# Patient Record
Sex: MALE | Race: WHITE | Employment: FULL TIME | URBAN - METROPOLITAN AREA
[De-identification: names, ages, dates, MRNs, and addresses within clinical notes are randomized per-mention and may not be internally consistent; named-entity substitution may affect disease eponyms.]

---

## 2017-05-22 RX ORDER — GABAPENTIN 300 MG/1
CAPSULE ORAL
Qty: 60 CAPSULE | Refills: 2 | Status: SHIPPED | OUTPATIENT
Start: 2017-05-22 | End: 2017-09-13

## 2017-09-13 ENCOUNTER — OFFICE VISIT (OUTPATIENT)
Dept: INTERNAL MEDICINE CLINIC | Age: 37
End: 2017-09-13

## 2017-09-13 VITALS
SYSTOLIC BLOOD PRESSURE: 120 MMHG | RESPIRATION RATE: 16 BRPM | WEIGHT: 215 LBS | HEART RATE: 100 BPM | BODY MASS INDEX: 28.37 KG/M2 | DIASTOLIC BLOOD PRESSURE: 60 MMHG | TEMPERATURE: 99.8 F

## 2017-09-13 DIAGNOSIS — E78.2 MIXED HYPERLIPIDEMIA: ICD-10-CM

## 2017-09-13 DIAGNOSIS — Z11.4 SCREENING FOR HIV (HUMAN IMMUNODEFICIENCY VIRUS): ICD-10-CM

## 2017-09-13 DIAGNOSIS — H61.21 IMPACTED CERUMEN OF RIGHT EAR: ICD-10-CM

## 2017-09-13 DIAGNOSIS — R53.83 FATIGUE, UNSPECIFIED TYPE: ICD-10-CM

## 2017-09-13 DIAGNOSIS — J01.00 ACUTE NON-RECURRENT MAXILLARY SINUSITIS: Primary | ICD-10-CM

## 2017-09-13 PROCEDURE — 99214 OFFICE O/P EST MOD 30 MIN: CPT | Performed by: INTERNAL MEDICINE

## 2017-09-13 RX ORDER — AMOXICILLIN AND CLAVULANATE POTASSIUM 875; 125 MG/1; MG/1
1 TABLET, FILM COATED ORAL 2 TIMES DAILY
Qty: 20 TABLET | Refills: 0 | Status: SHIPPED | OUTPATIENT
Start: 2017-09-13 | End: 2017-09-23

## 2017-09-14 DIAGNOSIS — H61.21 IMPACTED CERUMEN OF RIGHT EAR: Primary | ICD-10-CM

## 2018-12-14 ENCOUNTER — TELEPHONE (OUTPATIENT)
Dept: INTERNAL MEDICINE CLINIC | Age: 38
End: 2018-12-14

## 2018-12-14 RX ORDER — AZITHROMYCIN 250 MG/1
TABLET, FILM COATED ORAL
Qty: 1 PACKET | Refills: 0 | Status: SHIPPED | OUTPATIENT
Start: 2018-12-14 | End: 2019-02-12 | Stop reason: ALTCHOICE

## 2019-02-12 ENCOUNTER — OFFICE VISIT (OUTPATIENT)
Dept: INTERNAL MEDICINE CLINIC | Age: 39
End: 2019-02-12
Payer: COMMERCIAL

## 2019-02-12 VITALS
BODY MASS INDEX: 30.34 KG/M2 | HEART RATE: 91 BPM | RESPIRATION RATE: 16 BRPM | DIASTOLIC BLOOD PRESSURE: 70 MMHG | SYSTOLIC BLOOD PRESSURE: 126 MMHG | WEIGHT: 230 LBS | OXYGEN SATURATION: 99 %

## 2019-02-12 DIAGNOSIS — K86.89 PANCREATIC INSUFFICIENCY: Primary | ICD-10-CM

## 2019-02-12 PROCEDURE — 99214 OFFICE O/P EST MOD 30 MIN: CPT | Performed by: INTERNAL MEDICINE

## 2019-03-18 PROBLEM — Z12.11 COLON CANCER SCREENING: Status: ACTIVE | Noted: 2019-03-18

## 2019-03-23 ENCOUNTER — HOSPITAL ENCOUNTER (EMERGENCY)
Age: 39
Discharge: HOME | End: 2019-03-23
Attending: EMERGENCY MEDICINE
Payer: COMMERCIAL

## 2019-03-23 VITALS
DIASTOLIC BLOOD PRESSURE: 80 MMHG | BODY MASS INDEX: 29.82 KG/M2 | WEIGHT: 225 LBS | SYSTOLIC BLOOD PRESSURE: 127 MMHG | OXYGEN SATURATION: 98 % | TEMPERATURE: 98.1 F | HEIGHT: 73 IN | HEART RATE: 62 BPM | RESPIRATION RATE: 12 BRPM

## 2019-03-23 DIAGNOSIS — T14.8XXA INTRAMUSCULAR HEMATOMA: Primary | ICD-10-CM

## 2019-03-23 PROCEDURE — 99282 EMERGENCY DEPT VISIT SF MDM: CPT

## 2019-03-23 ASSESSMENT — PAIN SCALES - GENERAL: PAINLEVEL_OUTOF10: 6

## 2019-03-23 ASSESSMENT — PAIN DESCRIPTION - ORIENTATION: ORIENTATION: OTHER (COMMENT)

## 2019-03-23 ASSESSMENT — PAIN DESCRIPTION - PAIN TYPE: TYPE: ACUTE PAIN

## 2019-04-17 PROBLEM — Z12.11 COLON CANCER SCREENING: Status: RESOLVED | Noted: 2019-03-18 | Resolved: 2019-04-17

## 2019-12-04 ENCOUNTER — OFFICE VISIT (OUTPATIENT)
Dept: INTERNAL MEDICINE CLINIC | Age: 39
End: 2019-12-04
Payer: COMMERCIAL

## 2019-12-04 VITALS
SYSTOLIC BLOOD PRESSURE: 126 MMHG | DIASTOLIC BLOOD PRESSURE: 74 MMHG | WEIGHT: 228.4 LBS | RESPIRATION RATE: 16 BRPM | HEART RATE: 97 BPM | BODY MASS INDEX: 30.13 KG/M2 | OXYGEN SATURATION: 98 %

## 2019-12-04 DIAGNOSIS — M75.82 PECTORALIS MAJOR TENDINITIS, LEFT: Primary | ICD-10-CM

## 2019-12-04 PROCEDURE — 99213 OFFICE O/P EST LOW 20 MIN: CPT | Performed by: INTERNAL MEDICINE

## 2019-12-04 ASSESSMENT — PATIENT HEALTH QUESTIONNAIRE - PHQ9
2. FEELING DOWN, DEPRESSED OR HOPELESS: 0
SUM OF ALL RESPONSES TO PHQ9 QUESTIONS 1 & 2: 0
SUM OF ALL RESPONSES TO PHQ QUESTIONS 1-9: 0
SUM OF ALL RESPONSES TO PHQ QUESTIONS 1-9: 0
1. LITTLE INTEREST OR PLEASURE IN DOING THINGS: 0

## 2020-01-03 PROBLEM — Z12.11 COLON CANCER SCREENING: Status: RESOLVED | Noted: 2019-03-18 | Resolved: 2020-01-03

## 2021-03-13 ENCOUNTER — APPOINTMENT (OUTPATIENT)
Dept: ULTRASOUND IMAGING | Age: 41
End: 2021-03-13
Payer: COMMERCIAL

## 2021-03-13 ENCOUNTER — APPOINTMENT (OUTPATIENT)
Dept: GENERAL RADIOLOGY | Age: 41
End: 2021-03-13
Payer: COMMERCIAL

## 2021-03-13 ENCOUNTER — HOSPITAL ENCOUNTER (EMERGENCY)
Age: 41
Discharge: HOME | End: 2021-03-13
Payer: COMMERCIAL

## 2021-03-13 VITALS
OXYGEN SATURATION: 100 % | HEIGHT: 73 IN | BODY MASS INDEX: 29.16 KG/M2 | SYSTOLIC BLOOD PRESSURE: 133 MMHG | WEIGHT: 220 LBS | HEART RATE: 61 BPM | DIASTOLIC BLOOD PRESSURE: 87 MMHG | RESPIRATION RATE: 16 BRPM | TEMPERATURE: 98 F

## 2021-03-13 DIAGNOSIS — M79.605 LEFT LEG PAIN: Primary | ICD-10-CM

## 2021-03-13 DIAGNOSIS — M25.562 POSTERIOR KNEE PAIN, LEFT: ICD-10-CM

## 2021-03-13 PROCEDURE — 73564 X-RAY EXAM KNEE 4 OR MORE: CPT

## 2021-03-13 PROCEDURE — 99285 EMERGENCY DEPT VISIT HI MDM: CPT

## 2021-03-13 PROCEDURE — 93971 EXTREMITY STUDY: CPT

## 2021-03-13 RX ORDER — NAPROXEN 500 MG/1
500 TABLET ORAL 2 TIMES DAILY PRN
Qty: 20 TABLET | Refills: 0 | Status: SHIPPED | OUTPATIENT
Start: 2021-03-13 | End: 2022-07-18

## 2021-03-13 NOTE — ED PROVIDER NOTES
eMERGENCY dEPARTMENT eNCOUnter    [address]      PCP: Brii Escoto MD    [address] [facility]    Chief Complaint   Patient presents with    Leg Pain     left hamstring and knee pain Monday and prior to this by slipping on ice       HPI    Donovan Schwab is a 36 y.o. male who presents with posterior left knee and hamstring pain. Onset has been progressively worsening over the last several weeks. Context is patient states in his [place] a month ago when he did frequent \"near slipping\" on wet nilsa while at work but was able to catch himself and denies any blunt trauma or fall onto the left lower leg. Believes that he initially pulled his left hamstring. Began having anterior left knee pain on Monday with increasing pain to the posterior knee last evening while at the grocery store. Denies any fall or blunt trauma to the left knee. No previous history of left lower leg fracture or surgery. Pain worsens with weightbearing. Pain does intermittently radiate into the left calf. At time of my evaluation, patient is denying any pain. No numbness tingling weakness rating down the left lower leg. No saddle paresthesia. No bowel incontinence or bladder retention. No back pain or history of IV drug use. Has not noted any skin changes, rash or bruising to the posterior left leg. No history of IV drug use. Patient is most concerned for possible DVT but has no previous history of this. No recent long travel hospitalizations, surgeries, prolonged immobilization, personal history of malignancy. REVIEW OF SYSTEMS    General: Denies fever or chills  Cardiac: Denies chest pain  Pulmonary: Denies shortness of breath  GI: Denies abdominal pain, vomiting, or diarrhea  : No dysuria or hematuria    Denies any other muscles skeletal injuries, including chest wall and back.     All other review of systems are negative  See HPI and nursing notes for additional information     PAST MEDICAL & SURGICAL HISTORY    Past Medical History:   Diagnosis Date    Depression and anxiety 2008    Rosita    Medial epicondylitis of elbow 5/6/2015    Restless leg syndrome 2015    Snuff user 2014     History reviewed. No pertinent surgical history. CURRENT MEDICATIONS    Current Outpatient Rx   Medication Sig Dispense Refill    naproxen (NAPROSYN) 500 MG tablet Take 1 tablet by mouth 2 times daily as needed for Pain 20 tablet 0    clonazePAM (KLONOPIN) 0.5 MG tablet Take 0.5 mg by mouth 2 times daily as needed. Marga Greaser escitalopram (LEXAPRO) 10 MG tablet Take 10 mg by mouth daily         ALLERGIES    No Known Allergies    SOCIAL & FAMILY HISTORY    Social History     Socioeconomic History    Marital status:      Spouse name: Dennise Flores Number of children: None    Years of education: HS    Highest education level: None   Occupational History    Occupation:    Social Needs    Financial resource strain: None    Food insecurity     Worry: None     Inability: None    Transportation needs     Medical: None     Non-medical: None   Tobacco Use    Smoking status: Never Smoker    Smokeless tobacco: Current User     Types: Snuff    Tobacco comment: snuff tobacco   Substance and Sexual Activity    Alcohol use: No     Alcohol/week: 0.0 standard drinks    Drug use: No    Sexual activity: None   Lifestyle    Physical activity     Days per week: None     Minutes per session: None    Stress: None   Relationships    Social connections     Talks on phone: None     Gets together: None     Attends Caodaism service: None     Active member of club or organization: None     Attends meetings of clubs or organizations: None     Relationship status: None    Intimate partner violence     Fear of current or ex partner: None     Emotionally abused: None     Physically abused: None     Forced sexual activity: None   Other Topics Concern    None   Social History Narrative    None     History reviewed. No pertinent family history. PHYSICAL EXAM    VITAL SIGNS: /87   Pulse 61   Temp 98 °F (36.7 °C) (Oral)   Resp 16   Ht 6' 1\" (1.854 m)   Wt 220 lb (99.8 kg)   SpO2 100%   BMI 29.03 kg/m²   Constitutional:  Well developed, well nourished, no acute distress, non-toxic appearance   HENT:  Atraumatic, Normocephalic, EOMIs, conjunctiva clear, nasal bones midline  Musculoskeletal:    Left lower leg exam:   -Inspection:  No obvious defects or deformities, skin intact. No asymmetry of the left lower leg compared to the right. No abnormal hip rotation or limb shortening.   - Palpation: No redness, or warmth. Mild tenderness palpation along the mid to distal posterior thigh/hamstring and popliteal left knee without palpable bony or soft tissue defects. No knee joint effusion. No anterior knee joint tenderness, no localized tenderness of the tibial plateau. Hip joint is stable and nontender. Compartments are soft throughout the left lower extremity. Calf is supple without woody sensation. -ROM: Full range of motion of left hip, knee and ankle with 5/5 strength on resistance testing. Achilles tendon is clinically intact on provocative testing. No knee joint laxity. No swelling, discoloration, tenderness to palpation, or range of motion deficit of the ipsilateral hip and ankle  Vascular: Distal pulses (DP, PT) intact on affected side. Integument:  Well hydrated, no rash   Neurologic:  Awake and alert, normal flow of speech. No foot drop of the affected extremity. DTRs and distal sensation equal/intact. Psychiatric: Cooperative, pleasant affect         RADIOLOGY/PROCEDURES          VL DUP LOWER EXTREMITY VENOUS LEFT (Final result)  Result time 03/13/21 15:56:43  Final result by Freddy Colbert MD (03/13/21 15:56:43)                Impression:    No evidence of DVT in the left lower extremity.              Narrative:    EXAMINATION:   DUPLEX VENOUS ULTRASOUND OF THE LEFT LOWER EXTREMITY     3/13/2021 3:22 pm     TECHNIQUE:   Duplex ultrasound using B-mode/gray scaled imaging and Doppler spectral   analysis and color flow was obtained of the left lower extremity. COMPARISON:   None. HISTORY:   ORDERING SYSTEM PROVIDED HISTORY: pain posterior thigh/knee, nki, eval for DVT     Acuity: Unknown   Type of Exam: Initial     FINDINGS:   The visualized veins of the left lower extremity are patent and free of   echogenic thrombus. The veins demonstrate good compressibility with normal   color flow study and spectral analysis.         XR KNEE LEFT (MIN 4 VIEWS) (Final result)  Result time 03/13/21 15:32:19  Procedure changed from XR KNEE LEFT (3 VIEWS)  Final result by Rowdy Cooper MD (03/13/21 15:32:19)                Impression:    No acute osseous abnormality of the left knee. Narrative:    EXAMINATION:   FOUR XRAY VIEWS OF THE LEFT KNEE     3/13/2021 3:15 pm     COMPARISON:   None. HISTORY:   Acute atraumatic pain for 3 weeks. FINDINGS:   No acute fracture or dislocation.  No significant degenerative changes or   definite joint effusion.  Nonspecific soft tissue irregularity posterior to   the distal femur.                       ED COURSE & MEDICAL DECISION MAKING       Vital signs and nursing notes reviewed during ED course. I have independently evaluated this patient . Supervising MD  - Dr. Delisa Schmitt - present in the Emergency Department, available for consultation, throughout entirety of  patient care. All pertinent Lab data and radiographic results reviewed with patient at bedside. The patient and/or the family were informed of the results of any tests/labs/imaging, the treatment plan, and time was allotted to answer questions.        Differential diagnosis: includes but not limited to ligamentous injury, tendon injury, soft tissue contusion/hematoma, fracture, dislocation, Infection, Neurologic Deficit/Injury, Meniscus tear, ACL tear, tibial plateau fracture, extensor mechanism rupture, dislocation, Arterial Injury/Ischemia. Clinical  IMPRESSION    1. Left leg pain    2. Posterior knee pain, left          Patient presents with posterior left thigh/knee pain for 4 weeks without blunt trauma. On exam, well-appearing nontoxic 28-year-old male, in no acute distress. Noted mildly hypertensive 157/104 but remaining vitals within normal limits. Denying any chest pain or shortness of breath. No gross bony deformities or asymmetry of the left lower leg compared to the right. Calf is supple and nontender. Mild tenderness location along the mid to distal posterior left thigh and popliteal knee without palpable bony or soft tissue defects. Knee joint is stable, no knee joint effusion or tenderness over the tibial plateau. Achilles tendon is clinically intact. Did offer pain control in the ED which patient declined. X-ray of the left knee reveals no evidence of osseous abnormality or joint effusion. Venous Doppler of the left lower leg is also negative for evidence of DVT. This time, unclear exact etiology of patient's persistent pain. Suspect musculoskeletal in nature, possible hamstring strain versus sprain given his near slip and slide injuries. We discussed symptomatic management outpatient follow-up with family doctor as well as orthopedics. Low clinical suspicion for ischemic limb, compartment syndrome, intra-articular joint infection/septic arthritis, DVT, osteomyelitis, arterial/neurologic injury, necrotizing fasciitis, or infected/rapidly expanding hematoma. Patient is discharged in stable condition. Educated on [address] therapy. Patient is given anti-inflammatories. May continue weightbearing as tolerated. Encouraged heat applications. . Patient is instructed to followup with orthopedics in 7-10 days, sooner with worsened symptoms.  Return precautions back to the ED discussed for any new or worsening symptoms. In light of current events, I did utilize appropriate PPE (including N95 face mask, safety glasses, gloves as recommended by the health facility/national standard best practice, during my bedside interactions with the patient. Patient was masked throughout ED course. Full droplet precaution as well as full PPE was followed throughout patient's ED course and evaluation. Diagnosis and plan discussed in detail with patient who understands and agrees. Patient agrees to return emergency department if symptoms worsen or any new symptoms develop. Comment: Please note this report has been produced using speech recognition software and may contain errors related to that system including errors in grammar, punctuation, and spelling, as well as words and phrases that may be inappropriate. If there are any questions or concerns please feel free to contact the dictating provider for clarification.           Vidal Emerson PA-C  03/13/21 1938

## 2021-03-16 ENCOUNTER — HOSPITAL ENCOUNTER (EMERGENCY)
Age: 41
Discharge: HOME | End: 2021-03-16
Attending: EMERGENCY MEDICINE
Payer: COMMERCIAL

## 2021-03-16 VITALS
OXYGEN SATURATION: 96 % | HEART RATE: 80 BPM | DIASTOLIC BLOOD PRESSURE: 86 MMHG | TEMPERATURE: 98.5 F | SYSTOLIC BLOOD PRESSURE: 138 MMHG | RESPIRATION RATE: 12 BRPM

## 2021-03-16 DIAGNOSIS — M79.605 LEFT LEG PAIN: Primary | ICD-10-CM

## 2021-03-16 PROCEDURE — 99283 EMERGENCY DEPT VISIT LOW MDM: CPT | Performed by: EMERGENCY MEDICINE

## 2021-03-16 ASSESSMENT — PAIN SCALES - GENERAL: PAINLEVEL_OUTOF10: 5

## 2021-03-16 ASSESSMENT — PAIN DESCRIPTION - LOCATION: LOCATION: LEG

## 2021-03-16 ASSESSMENT — PAIN DESCRIPTION - ORIENTATION: ORIENTATION: LEFT

## 2021-03-17 NOTE — ED NOTES
Patient presents to Ed with complaints of left leg pain, reports was seen here Saturday had ultrasound and now having worse pain.       Angie Calle RN  03/16/21 2889

## 2021-03-17 NOTE — ED PROVIDER NOTES
Emergency Department Encounter    Patient: Anisha Moon  MRN: [id number]  : 1980  Date of Evaluation: 3/16/2021  ED Provider:  Hector Jones    Triage Chief Complaint:   Leg Pain (left, seen saturday now pain is worse)      [place]:  Anisha Moon is a 36 y.o. male that presents to the emergency department for evaluation of left lower extremity pain. Patient was seen and evaluated in our emergency department on 3/13/2021 for similar symptoms. Over the past several weeks, he has had progressively worsening left lower extremity pain. Patient went to [place] 1 month ago and he had a near slipping incident on the floor. He was able to catch himself. No falls. He felt that he pulled his hamstring. Since then, he has had on and off pain. On evaluation, he was told that he does not have a blood clot and no fractures. He was discharged and instructed to rest, ice, compress, elevate the extremity. Patient was doing some reading online, became concerned about blood clot, decided to come to the emergency department for evaluation. He denies any new or changes in symptoms. Denies numbness, tingling, paresthesias. Denies color change of the extremity. No trauma. Denies additional precipitating, Ativan, alleviating features. ROS - see HPI, below listed is current ROS at time of my eval:  General:  No fevers  Musculoskeletal: Left leg pain  Skin:  No rash, no pruritis, no easy bruising  Neurologic:   no extremity numbness, no extremity tingling, no extremity weakness  Extremities: No cyanosis    Past Medical History:   Diagnosis Date    Depression and anxiety     Rosita    Medial epicondylitis of elbow 2015    Restless leg syndrome 2015    Snuff user        History reviewed. No pertinent surgical history. History reviewed. No pertinent family history.     Social History     Socioeconomic History    Marital status:      Spouse name: Varinder Hernandez Number of children: Not on file    Years of education: HS    Highest education level: Not on file   Occupational History    Occupation:    Social Needs    Financial resource strain: Not on file    Food insecurity     Worry: Not on file     Inability: Not on file   [employer] needs     Medical: Not on file     Non-medical: Not on file   Tobacco Use    Smoking status: Never Smoker    Smokeless tobacco: Current User     Types: Snuff    Tobacco comment: snuff tobacco   Substance and Sexual Activity    Alcohol use: No     Alcohol/week: 0.0 standard drinks    Drug use: No    Sexual activity: Not on file   Lifestyle    Physical activity     Days per week: Not on file     Minutes per session: Not on file    Stress: Not on file   Relationships    Social connections     Talks on phone: Not on file     Gets together: Not on file     Attends Christian service: Not on file     Active member of club or organization: Not on file     Attends meetings of clubs or organizations: Not on file     Relationship status: Not on file    Intimate partner violence     Fear of current or ex partner: Not on file     Emotionally abused: Not on file     Physically abused: Not on file     Forced sexual activity: Not on file   Other Topics Concern    Not on file   Social History Narrative    Not on file       No current facility-administered medications for this encounter. Current Outpatient Medications   Medication Sig Dispense Refill    naproxen (NAPROSYN) 500 MG tablet Take 1 tablet by mouth 2 times daily as needed for Pain 20 tablet 0    clonazePAM (KLONOPIN) 0.5 MG tablet Take 0.5 mg by mouth 2 times daily as needed. Hepzibah Flattery escitalopram (LEXAPRO) 10 MG tablet Take 10 mg by mouth daily         No Known Allergies    Nursing Notes Reviewed    Physical Exam:  Triage VS:    ED Triage Vitals [03/16/21 2231]   Enc Vitals Group      /86      Pulse 80      Resp 12      Temp 98.5 °F (36.9 °C)      Temp Source Oral      SpO2 96 %     A complete physical exam was unable to be performed as patient is evaluated through telehealth. MDM:  Patient was seen and evaluated in the emergency department by myself via telehealth platform. Records from recent visit on 3/13/2021 reviewed. Left knee x-ray radiology report reads no acute osseous abnormality left knee. Left lower extremity venous duplex radiology report reads the visualized veins of the left lower extremity are patent and free of echogenic thrombus. The veins demonstrate good compressibility with normal color flow and spectral analysis. These findings are discussed with the patient. Patient had a negative DVT study 3 days ago. He is low clinical risk for DVT formation, especially in the past 3 days. I do offer repeat DVT study, patient declines. He feels assured after my discussion. He is instructed to rest, ice, compress, elevate the extremity. Instructed follow-up with primary care provider. Instructed return with any new, worsening, concerning symptoms. He declines in-person evaluation, is satisfied with mental health visit and requests discharge due to long wait in the waiting room. Additional verbal and printed discharge instructions were provided. Patient is discharged in stable, ambulatory condition. Clinical Impression:  1. Left leg pain        Disposition referral:  Candance Amabile, MD  [place] [id number] [phone]      Please follow-up with your primary care provider for reevaluation. [facility] Emergency Department  [facility] [phone]  Go to   Immediately with any new, worsening, concerning symptoms.       ED Provider Disposition:  DISPOSITION Decision To Discharge 03/16/2021 10:42:54 PM      Comment: Please note this report has been produced using speech recognition software and may contain errors related to that system including errors in grammar, punctuation, and spelling, as well as words and phrases that may be inappropriate. If there are any questions or concerns please feel free to contact the dictating provider for clarification.          [address] [facility],   03/16/21 7008

## 2021-05-01 ENCOUNTER — HOSPITAL ENCOUNTER (EMERGENCY)
Age: 41
Discharge: HOME | End: 2021-05-01
Attending: EMERGENCY MEDICINE
Payer: COMMERCIAL

## 2021-05-01 VITALS
OXYGEN SATURATION: 96 % | HEART RATE: 75 BPM | DIASTOLIC BLOOD PRESSURE: 77 MMHG | SYSTOLIC BLOOD PRESSURE: 127 MMHG | WEIGHT: 235 LBS | TEMPERATURE: 99.3 F | RESPIRATION RATE: 17 BRPM | HEIGHT: 73 IN | BODY MASS INDEX: 31.14 KG/M2

## 2021-05-01 DIAGNOSIS — M25.562 CHRONIC PAIN OF LEFT KNEE: ICD-10-CM

## 2021-05-01 DIAGNOSIS — S86.912A STRAIN OF LEFT KNEE, INITIAL ENCOUNTER: Primary | ICD-10-CM

## 2021-05-01 DIAGNOSIS — G89.29 CHRONIC PAIN OF LEFT KNEE: ICD-10-CM

## 2021-05-01 PROCEDURE — 99282 EMERGENCY DEPT VISIT SF MDM: CPT

## 2021-05-01 NOTE — ED PROVIDER NOTES
CHIEF COMPLAINT  Chief Complaint   Patient presents with    Knee Pain     Reports injury 5 months ago but xray was negative, patient requesting MRI       HPI  J Carlos Perez is a 39 y.o. male with history of depression who presents left knee pain for the past 5 months has been waxing and waning, intermittently is at the lateral joint line or the medial joint line. Had a trauma 5 months ago and had a x-ray and DVT ultrasound at that time which was reassuring. He states the injury occurred at work. Since then he has had persistent signs and symptoms and believes he needs further work-up. He denies any overlying redness or fevers. Denies any repeat injury. Denies any pain at the hip or the ankle. He denies other concerns. REVIEW OF SYSTEMS  Review of Systems   History obtained from chart review and the patient  General ROS: negative for - chills or fever    ENT ROS: negative for - headaches  Hematological and Lymphatic ROS: negative for - bleeding problems or blood clots  Endocrine ROS: negative for - skin changes  Respiratory ROS: no cough, shortness of breath, or wheezing  Cardiovascular ROS: no chest pain or dyspnea on exertion    Musculoskeletal ROS: Positive for left knee pain  Neurological ROS: negative for - numbness/tingling or weakness      PAST MEDICAL HISTORY  Past Medical History:   Diagnosis Date    Depression and anxiety 2008    Rosita    Medial epicondylitis of elbow 5/6/2015    Restless leg syndrome 2015    Snuff user 2014       FAMILY HISTORY  History reviewed. No pertinent family history.     SOCIAL HISTORY  Social History     Socioeconomic History    Marital status:      Spouse name: Real Beckham Number of children: None    Years of education: HS    Highest education level: None   Occupational History    Occupation:    Social Needs    Financial resource strain: None    Food insecurity     Worry: None     Inability: None    Transportation needs Medical: None     Non-medical: None   Tobacco Use    Smoking status: Never Smoker    Smokeless tobacco: Current User     Types: Snuff    Tobacco comment: snuff tobacco   Substance and Sexual Activity    Alcohol use: No     Alcohol/week: 0.0 standard drinks    Drug use: No    Sexual activity: None   Lifestyle    Physical activity     Days per week: None     Minutes per session: None    Stress: None   Relationships    Social connections     Talks on phone: None     Gets together: None     Attends Denominational service: None     Active member of club or organization: None     Attends meetings of clubs or organizations: None     Relationship status: None    Intimate partner violence     Fear of current or ex partner: None     Emotionally abused: None     Physically abused: None     Forced sexual activity: None   Other Topics Concern    None   Social History Narrative    None       SURGICAL HISTORY  History reviewed. No pertinent surgical history. CURRENT MEDICATIONS  No current facility-administered medications on file prior to encounter. Current Outpatient Medications on File Prior to Encounter   Medication Sig Dispense Refill    naproxen (NAPROSYN) 500 MG tablet Take 1 tablet by mouth 2 times daily as needed for Pain 20 tablet 0    clonazePAM (KLONOPIN) 0.5 MG tablet Take 0.5 mg by mouth 2 times daily as needed. Marcos Andrews escitalopram (LEXAPRO) 10 MG tablet Take 10 mg by mouth daily           ALLERGIES  No Known Allergies    PHYSICAL EXAM  VITAL SIGNS: /77   Pulse 75   Temp 99.3 °F (37.4 °C) (Oral)   Resp 17   Ht 6' 1\" (1.854 m)   Wt 235 lb (106.6 kg)   SpO2 96%   BMI 31.00 kg/m²   Constitutional: Well developed, Well nourished, No acute distress, Non-toxic appearance. HENT: Normocephalic, Atraumatic, Bilateral external ears normal, skin place per recommendations  Eyes: Conjunctiva normal, No discharge. Neck: Normal range of motion, Supple, No stridor.    Cardiovascular: Normal heart gave me permission to discuss medical history, care, and plan with those present in the room.   Electronically signed by: Felipe Neri MD, 5/1/2021  MD Felipe Victoria MD  05/01/21 1155

## 2022-05-28 ENCOUNTER — HOSPITAL ENCOUNTER (EMERGENCY)
Age: 42
Discharge: HOME | End: 2022-05-29
Payer: COMMERCIAL

## 2022-05-28 DIAGNOSIS — K40.90 UNILATERAL INGUINAL HERNIA WITHOUT OBSTRUCTION OR GANGRENE, RECURRENCE NOT SPECIFIED: Primary | ICD-10-CM

## 2022-05-28 PROCEDURE — 99285 EMERGENCY DEPT VISIT HI MDM: CPT

## 2022-05-28 ASSESSMENT — PAIN SCALES - GENERAL: PAINLEVEL_OUTOF10: 1

## 2022-05-28 ASSESSMENT — PAIN - FUNCTIONAL ASSESSMENT: PAIN_FUNCTIONAL_ASSESSMENT: NONE - DENIES PAIN

## 2022-05-28 NOTE — Clinical Note
Galilea Hair was seen and treated in our emergency department on 5/28/2022. He may return to work on 06/01/2022. Or when cleared by Occupational Health     If you have any questions or concerns, please don't hesitate to call.       Ryan Call PA-C

## 2022-05-29 ENCOUNTER — APPOINTMENT (OUTPATIENT)
Dept: CT IMAGING | Age: 42
End: 2022-05-29
Payer: COMMERCIAL

## 2022-05-29 VITALS
HEIGHT: 73 IN | TEMPERATURE: 98.6 F | WEIGHT: 235 LBS | OXYGEN SATURATION: 99 % | DIASTOLIC BLOOD PRESSURE: 78 MMHG | BODY MASS INDEX: 31.14 KG/M2 | RESPIRATION RATE: 16 BRPM | SYSTOLIC BLOOD PRESSURE: 142 MMHG | HEART RATE: 66 BPM

## 2022-05-29 LAB
ALBUMIN SERPL-MCNC: 4.3 GM/DL (ref 3.4–5)
ALP BLD-CCNC: 59 IU/L (ref 40–129)
ALT SERPL-CCNC: 36 U/L (ref 10–40)
ANION GAP SERPL CALCULATED.3IONS-SCNC: 10 MMOL/L (ref 4–16)
AST SERPL-CCNC: 33 IU/L (ref 15–37)
BASOPHILS ABSOLUTE: 0 K/CU MM
BASOPHILS RELATIVE PERCENT: 0.4 % (ref 0–1)
BILIRUB SERPL-MCNC: 0.3 MG/DL (ref 0–1)
BUN BLDV-MCNC: 26 MG/DL (ref 6–23)
CALCIUM SERPL-MCNC: 9.1 MG/DL (ref 8.3–10.6)
CHLORIDE BLD-SCNC: 99 MMOL/L (ref 99–110)
CO2: 26 MMOL/L (ref 21–32)
CREAT SERPL-MCNC: 1.3 MG/DL (ref 0.9–1.3)
DIFFERENTIAL TYPE: ABNORMAL
EOSINOPHILS ABSOLUTE: 0.2 K/CU MM
EOSINOPHILS RELATIVE PERCENT: 1.6 % (ref 0–3)
GFR AFRICAN AMERICAN: >60 ML/MIN/1.73M2
GFR NON-AFRICAN AMERICAN: >60 ML/MIN/1.73M2
GLUCOSE BLD-MCNC: 110 MG/DL (ref 70–99)
HCT VFR BLD CALC: 51.6 % (ref 42–52)
HEMOGLOBIN: 17 GM/DL (ref 13.5–18)
IMMATURE NEUTROPHIL %: 0.3 % (ref 0–0.43)
LACTATE: 1.1 MMOL/L (ref 0.4–2)
LYMPHOCYTES ABSOLUTE: 1.7 K/CU MM
LYMPHOCYTES RELATIVE PERCENT: 16.8 % (ref 24–44)
MCH RBC QN AUTO: 30.7 PG (ref 27–31)
MCHC RBC AUTO-ENTMCNC: 32.9 % (ref 32–36)
MCV RBC AUTO: 93.3 FL (ref 78–100)
MONOCYTES ABSOLUTE: 0.4 K/CU MM
MONOCYTES RELATIVE PERCENT: 3.9 % (ref 0–4)
NUCLEATED RBC %: 0 %
PDW BLD-RTO: 14.4 % (ref 11.7–14.9)
PLATELET # BLD: 249 K/CU MM (ref 140–440)
PMV BLD AUTO: 9.6 FL (ref 7.5–11.1)
POTASSIUM SERPL-SCNC: 4 MMOL/L (ref 3.5–5.1)
RBC # BLD: 5.53 M/CU MM (ref 4.6–6.2)
SEGMENTED NEUTROPHILS ABSOLUTE COUNT: 7.8 K/CU MM
SEGMENTED NEUTROPHILS RELATIVE PERCENT: 77 % (ref 36–66)
SODIUM BLD-SCNC: 135 MMOL/L (ref 135–145)
TOTAL IMMATURE NEUTOROPHIL: 0.03 K/CU MM
TOTAL NUCLEATED RBC: 0 K/CU MM
TOTAL PROTEIN: 7.8 GM/DL (ref 6.4–8.2)
WBC # BLD: 10.1 K/CU MM (ref 4–10.5)

## 2022-05-29 PROCEDURE — 85025 COMPLETE CBC W/AUTO DIFF WBC: CPT

## 2022-05-29 PROCEDURE — 80053 COMPREHEN METABOLIC PANEL: CPT

## 2022-05-29 PROCEDURE — 74177 CT ABD & PELVIS W/CONTRAST: CPT

## 2022-05-29 PROCEDURE — 6360000004 HC RX CONTRAST MEDICATION: Performed by: PHYSICIAN ASSISTANT

## 2022-05-29 PROCEDURE — 83605 ASSAY OF LACTIC ACID: CPT

## 2022-05-29 RX ADMIN — IOPAMIDOL 75 ML: 755 INJECTION, SOLUTION INTRAVENOUS at 01:53

## 2022-05-29 NOTE — ACP (ADVANCE CARE PLANNING)
Patient does not have any ACP documents/Medical Power of . LSW notes hospital will follow [place]'s Next of Kin hierarchy in the following descending order for priority:    Guardian  Spouse  [de-identified] of adult Children  Parents  [de-identified] of adult Siblings  Nearest Relative not described above    Per [place]'s Next of Kin hierarchy: Patients' parent will be [address].

## 2022-05-29 NOTE — ED PROVIDER NOTES
[facility] [address] EMERGENCY DEPARTMENT    Patient: Maru Allan  MRN: [id number]  : 1980  Date of Evaluation: 2022  ED Provider: [address], PA-C    Chief Complaint       Chief Complaint   Patient presents with    Inguinal Hernia     Lifting something at work, felt like a tear       Adilson Cleary is a 43 y.o. male who presents to the emergency department for right inguinal pain. Onset was about a week ago. Patient states he was lifting something at work and felt a tearing sensation. Localized to the right inguinal region. He states he has been having intermittent pain since then. Worse when bending down or rolling over in bed. Severity 1/10. Associated intermittent bulging to the area. Denies any redness or warmth. Denies difficulties urinating. Denies any other complaints. ROS     CONSTITUTIONAL:  Denies fever. EYES:  Denies visual changes. HEAD:  Denies headache. ENT:  Denies earache, nasal congestion, sore throat. NECK:  Denies neck pain. RESPIRATORY:  Denies any shortness of breath. CARDIOVASCULAR:  Denies chest pain. GI:  Denies nausea or vomiting. :  Denies urinary symptoms. MUSCULOSKELETAL:  Denies extremity pain or swelling. BACK:  Denies back pain. INTEGUMENT:  Denies skin changes. LYMPHATIC:  Denies lymphadenopathy. NEUROLOGIC:  Denies any numbness/tingling. PSYCHIATRIC:  Denies SI/HI. Past History     Past Medical History:   Diagnosis Date    Depression and anxiety     Rosita    Medial epicondylitis of elbow 2015    Restless leg syndrome 2015    Snuff user      History reviewed. No pertinent surgical history.   Social History     Socioeconomic History    Marital status:      Spouse name: Tomas Morris Number of children: None    Years of education: HS    Highest education level: None   Occupational History    Occupation:  Tobacco Use    Smoking status: Never Smoker    Smokeless tobacco: Current User     Types: Snuff    Tobacco comment: snuff tobacco   Substance and Sexual Activity    Alcohol use: No     Alcohol/week: 0.0 standard drinks    Drug use: No    Sexual activity: None   Other Topics Concern    None   Social History Narrative    None     Social Determinants of Health     Financial Resource Strain:     Difficulty of Paying Living Expenses: Not on file   Food Insecurity:     Worried About Running Out of Food in the Last Year: Not on file    Tierra of Food in the Last Year: Not on file   Transportation Needs:     Lack of Transportation (Medical): Not on file    Lack of Transportation (Non-Medical): Not on file   Physical Activity:     Days of Exercise per Week: Not on file    Minutes of Exercise per Session: Not on file   Stress:     Feeling of Stress : Not on file   Social Connections:     Frequency of Communication with Friends and Family: Not on file    Frequency of Social Gatherings with Friends and Family: Not on file    Attends Rastafarian Services: Not on file    Active Member of [address] [place] [address] or Organizations: Not on file    Attends Club or Organization Meetings: Not on file    Marital Status: Not on file   Intimate Partner Violence:     Fear of Current or Ex-Partner: Not on file    Emotionally Abused: Not on file    Physically Abused: Not on file    Sexually Abused: Not on file   Housing Stability:     Unable to Pay for Housing in the Last Year: Not on file    Number of [place] in the Last Year: Not on file    Unstable Housing in the Last Year: Not on file       Medications/Allergies     Previous Medications    CLONAZEPAM (KLONOPIN) 0.5 MG TABLET    Take 0.5 mg by mouth 2 times daily as needed. Heddy      ESCITALOPRAM (LEXAPRO) 10 MG TABLET    Take 10 mg by mouth daily    NAPROXEN (NAPROSYN) 500 MG TABLET    Take 1 tablet by mouth 2 times daily as needed for Pain     No Known Allergies     Physical Exam       ED Triage Vitals [05/28/22 1938]   BP Temp Temp Source Heart Rate Resp SpO2 Height Weight   137/78 98.1 °F (36.7 °C) Oral 78 15 96 % 6' 1\" (1.854 m) 235 lb (106.6 kg)     GENERAL APPEARANCE:  Well-developed, well-nourished, no acute distress. HEAD:  NC/AT. EYES:  Sclera anicteric. ENT:  Ears, nose, mouth normal.     NECK:  Supple. CARDIO:  RRR. LUNGS:   CTAB. Respirations unlabored. ABDOMEN:  Soft, non-distended, non-tender. BS active. :  Slight bulge noted in right inguinal area, easily reducible, no significant tenderness, no erythema or warmth. EXTREMITIES:  No acute deformities. SKIN:  Warm and dry. NEUROLOGICAL:  Alert and oriented. PSYCHIATRIC:  Normal mood. Diagnostics     Labs:  Labs Reviewed   CBC WITH AUTO DIFFERENTIAL - Abnormal; Notable for the following components:       Result Value    Segs Relative 77.0 (*)     Lymphocytes % 16.8 (*)     All other components within normal limits   COMPREHENSIVE METABOLIC PANEL W/ REFLEX TO MG FOR LOW K - Abnormal; Notable for the following components:    BUN 26 (*)     Glucose 110 (*)     All other components within normal limits   LACTIC ACID     Radiographs:    CT ABDOMEN PELVIS W IV CONTRAST Additional Contrast? None    Result Date: 5/29/2022  EXAMINATION: CT OF THE ABDOMEN AND PELVIS WITH CONTRAST 5/29/2022 1:53 am TECHNIQUE: CT of the abdomen and pelvis was performed with the administration of intravenous contrast. Multiplanar reformatted images are provided for review. Automated exposure control, iterative reconstruction, and/or weight based adjustment of the mA/kV was utilized to reduce the radiation dose to as low as reasonably achievable. COMPARISON: Abdomen decubitus April 20, 2016.  HISTORY: ORDERING SYSTEM PROVIDED HISTORY: right inguinal pain, suspected hernia after lifting TECHNOLOGIST PROVIDED HISTORY: Additional Contrast?->None Reason for exam:->right inguinal pain, suspected hernia after lifting Decision Support Exception - unselect if not a suspected or confirmed emergency medical condition->Emergency Medical Condition (MA) Reason for Exam: right inguinal pain, suspected hernia after lifting FINDINGS: Abdomen/Pelvis: Lower chest: The lung bases are well aerated. Pleural surfaces are unremarkable and no evidence of pleural effusion is identified. Heart is mildly enlarged. Organs: The liver, gallbladder, spleen, pancreas, adrenal glands, kidneys, are unremarkable in appearance. GI/Bowel: Small hiatal hernia is present. The stomach is unremarkable without wall thickening or distention. Bowel loops are unremarkable in appearance without evidence of obstruction, distension or mucosal thickening. Pelvis: The urinary bladder is well distended and unremarkable in appearance. No evidence of pelvic free fluid is seen. Peritoneum/Retroperitoneum: No evidence of retroperitoneal or intraperitoneal lymphadenopathy is identified. No evidence of intraperitoneal free fluid is seen. Bones/Soft Tissues: The bones, skeletal muscle bundles, fascial planes and subcutaneous soft tissues are unremarkable in appearance. 1. No evidence of inguinal herniation. 2. Small hiatal hernia. 3. No acute abnormality of the abdomen or pelvis. 4. Mild cardiomegaly. ED Course and MDM   -  Patient seen and evaluated in the emergency department. -  Triage and nursing notes reviewed and incorporated. -  Old chart records reviewed and incorporated. -  Supervising physician was Dr. Magdi Corado. Patient was seen independently. -  Work-up included:  see above  -  ED medications:  Declined need for pain medication  -  Results discussed with patient. CT abd pelv is unremarkable. Labs unremarkable. He will FU with [address] and also provided General Surgery if necessary. Given lifting precautions. Return as needed.   He is agreeable with plan of care and disposition.  -  Disposition:  Home    In light of current events, I did utilize appropriate PPE

## 2022-07-21 PROBLEM — R10.31 CHRONIC GROIN PAIN, RIGHT: Status: ACTIVE | Noted: 2022-07-21

## 2022-07-21 PROBLEM — K40.90 UNILATERAL INGUINAL HERNIA WITHOUT OBSTRUCTION OR GANGRENE: Status: ACTIVE | Noted: 2022-07-21

## 2022-07-21 PROBLEM — G89.29 CHRONIC GROIN PAIN, RIGHT: Status: ACTIVE | Noted: 2022-07-21

## 2022-10-25 ENCOUNTER — HOSPITAL ENCOUNTER (EMERGENCY)
Age: 42
Discharge: HOME | End: 2022-10-25
Attending: EMERGENCY MEDICINE
Payer: COMMERCIAL

## 2022-10-25 ENCOUNTER — APPOINTMENT (OUTPATIENT)
Dept: GENERAL RADIOLOGY | Age: 42
End: 2022-10-25

## 2022-10-25 ENCOUNTER — APPOINTMENT (OUTPATIENT)
Dept: CT IMAGING | Age: 42
End: 2022-10-25

## 2022-10-25 VITALS
DIASTOLIC BLOOD PRESSURE: 97 MMHG | HEIGHT: 73 IN | OXYGEN SATURATION: 97 % | RESPIRATION RATE: 16 BRPM | SYSTOLIC BLOOD PRESSURE: 153 MMHG | BODY MASS INDEX: 30.48 KG/M2 | HEART RATE: 84 BPM | TEMPERATURE: 97.9 F | WEIGHT: 230 LBS

## 2022-10-25 DIAGNOSIS — R53.83 OTHER FATIGUE: Primary | ICD-10-CM

## 2022-10-25 LAB
ALBUMIN SERPL-MCNC: 4.2 GM/DL (ref 3.4–5)
ALP BLD-CCNC: 58 IU/L (ref 40–129)
ALT SERPL-CCNC: 37 U/L (ref 10–40)
ANION GAP SERPL CALCULATED.3IONS-SCNC: 9 MMOL/L (ref 4–16)
AST SERPL-CCNC: 27 IU/L (ref 15–37)
BASOPHILS ABSOLUTE: 0 K/CU MM
BASOPHILS RELATIVE PERCENT: 0.3 % (ref 0–1)
BILIRUB SERPL-MCNC: 0.3 MG/DL (ref 0–1)
BUN BLDV-MCNC: 14 MG/DL (ref 6–23)
CALCIUM SERPL-MCNC: 9.2 MG/DL (ref 8.3–10.6)
CHLORIDE BLD-SCNC: 102 MMOL/L (ref 99–110)
CO2: 25 MMOL/L (ref 21–32)
CREAT SERPL-MCNC: 1.3 MG/DL (ref 0.9–1.3)
DIFFERENTIAL TYPE: ABNORMAL
EKG ATRIAL RATE: 75 BPM
EKG DIAGNOSIS: NORMAL
EKG Q-T INTERVAL: 408 MS
EKG QRS DURATION: 130 MS
EKG QTC CALCULATION (BAZETT): 493 MS
EKG R AXIS: 101 DEGREES
EKG T AXIS: -1 DEGREES
EKG VENTRICULAR RATE: 88 BPM
EOSINOPHILS ABSOLUTE: 0.1 K/CU MM
EOSINOPHILS RELATIVE PERCENT: 0.6 % (ref 0–3)
GFR SERPL CREATININE-BSD FRML MDRD: >60 ML/MIN/1.73M2
GLUCOSE BLD-MCNC: 134 MG/DL (ref 70–99)
HCT VFR BLD CALC: 52.3 % (ref 42–52)
HEMOGLOBIN: 17.2 GM/DL (ref 13.5–18)
IMMATURE NEUTROPHIL %: 0.5 % (ref 0–0.43)
LYMPHOCYTES ABSOLUTE: 1.5 K/CU MM
LYMPHOCYTES RELATIVE PERCENT: 17.2 % (ref 24–44)
MCH RBC QN AUTO: 31 PG (ref 27–31)
MCHC RBC AUTO-ENTMCNC: 32.9 % (ref 32–36)
MCV RBC AUTO: 94.4 FL (ref 78–100)
MONOCYTES ABSOLUTE: 0.5 K/CU MM
MONOCYTES RELATIVE PERCENT: 6.1 % (ref 0–4)
NUCLEATED RBC %: 0 %
PDW BLD-RTO: 14.3 % (ref 11.7–14.9)
PLATELET # BLD: 279 K/CU MM (ref 140–440)
PMV BLD AUTO: 9.9 FL (ref 7.5–11.1)
POTASSIUM SERPL-SCNC: 4 MMOL/L (ref 3.5–5.1)
PRO-BNP: 58.34 PG/ML
RAPID INFLUENZA  B AGN: NEGATIVE
RAPID INFLUENZA A AGN: NEGATIVE
RBC # BLD: 5.54 M/CU MM (ref 4.6–6.2)
SARS-COV-2, NAAT: NOT DETECTED
SEGMENTED NEUTROPHILS ABSOLUTE COUNT: 6.7 K/CU MM
SEGMENTED NEUTROPHILS RELATIVE PERCENT: 75.3 % (ref 36–66)
SODIUM BLD-SCNC: 136 MMOL/L (ref 135–145)
SOURCE: NORMAL
TOTAL IMMATURE NEUTOROPHIL: 0.04 K/CU MM
TOTAL NUCLEATED RBC: 0 K/CU MM
TOTAL PROTEIN: 7.3 GM/DL (ref 6.4–8.2)
TROPONIN T: <0.01 NG/ML
WBC # BLD: 8.9 K/CU MM (ref 4–10.5)

## 2022-10-25 PROCEDURE — 87635 SARS-COV-2 COVID-19 AMP PRB: CPT

## 2022-10-25 PROCEDURE — 93005 ELECTROCARDIOGRAM TRACING: CPT | Performed by: NURSE PRACTITIONER

## 2022-10-25 PROCEDURE — 99285 EMERGENCY DEPT VISIT HI MDM: CPT

## 2022-10-25 PROCEDURE — 83880 ASSAY OF NATRIURETIC PEPTIDE: CPT

## 2022-10-25 PROCEDURE — 85025 COMPLETE CBC W/AUTO DIFF WBC: CPT

## 2022-10-25 PROCEDURE — 87804 INFLUENZA ASSAY W/OPTIC: CPT

## 2022-10-25 PROCEDURE — 93010 ELECTROCARDIOGRAM REPORT: CPT | Performed by: INTERNAL MEDICINE

## 2022-10-25 PROCEDURE — 71045 X-RAY EXAM CHEST 1 VIEW: CPT

## 2022-10-25 PROCEDURE — 70450 CT HEAD/BRAIN W/O DYE: CPT

## 2022-10-25 PROCEDURE — 84484 ASSAY OF TROPONIN QUANT: CPT

## 2022-10-25 PROCEDURE — 80053 COMPREHEN METABOLIC PANEL: CPT

## 2022-10-25 ASSESSMENT — ENCOUNTER SYMPTOMS
BLOOD IN STOOL: 0
DIARRHEA: 0
COLOR CHANGE: 0
VOMITING: 0
CONSTIPATION: 0
CHEST TIGHTNESS: 0
ABDOMINAL PAIN: 0
COUGH: 0
NAUSEA: 0
SORE THROAT: 0
SHORTNESS OF BREATH: 0

## 2022-10-25 NOTE — ED NOTES
Pt presents to ED from home due to c/o dizziness, nausea and a fall for x3 days. Pt states he had a strong dizzy spell this morning at 3am which resulted in him falling, no LOC and he did not hit his head.      Devonne Epley, RN  10/25/22 6196

## 2022-10-25 NOTE — Clinical Note
Narciso Scott was seen and treated in our emergency department on 10/25/2022. He may return to work on 10/28/2022. If you have any questions or concerns, please don't hesitate to call.       Talha Mendiola, DO

## 2022-10-25 NOTE — ED PROVIDER NOTES
I independently examined and evaluated Hailee Aguillon. In brief their history revealed  a 43 y.o. male who presents to the emergency department with fatigue and weakness that has been ongoing for the past 3 to 4 days. He is employed at the [employer]. He is here with his significant other. He has been feeling fatigue and weak with blurred vision for the past 2 to 3 days. Today he actually had a episode where he was so weak that he had to crawl on the floor. He denies any recent traumas. He has had episodes like this in the past but not to this extreme. He does have a history of anxiety and depression. He takes Klonopin for this and has taken several Klonopin today. Denies nausea, vomiting, diarrhea or fevers. He not had any headaches, body aches, sore throat. He has no cardiac history. He is a non-smoker. Denies drug or alcohol use. Their focused exam revealed a 45-year-old male resting in bed comfortably no distress normocephalic atraumatic sclera clear lungs clear heart regular rate and rhythm 2+ pulses throughout abdomen soft nontender bowel sounds present normal.  5 5 strength throughout skin has no rash or swelling cranial nerves intact pupils equal reactive normal finger-to-nose no sensory deficit    ED course: Patient seen with NP please see her note. Patient here with some worsening fatigue general malaise. He has been working long hours as a post office person. He denies any fevers chest pain shortness of breath had some generalized weakness today lethargy. Initially had trouble getting in and out of bed. He ambulated back to the room. He has no headache or slurred speech or facial droop denies any other medical problems or medication changes or diet changes etc.  He otherwise appears well we did do labs imaging head CT chest x-ray EKG EKG showed possible A. fib however otherwise work-up was unremarkable including troponin BNP basic lab work.   Patient otherwise stable he feels comfortable going home unclear etiology of his symptoms COVID and flu are negative discharge given return precautions and follow-up information. Stable. He mentions some blurry vision from time to time he could have a viral syndrome he has no fever no nuchal rigidity I do not think he has stroke or meningitis or ACS or DVT or PE or AAA or sepsis or Guillain-Barré or cord syndrome or cauda equina. 12 lead EKG per my interpretation:  Atrial Fibrillation 88  Axis is   Rightward  QTc is   493  There is no specific T wave changes appreciated. There is no specific ST wave changes appreciated. Prior EKG to compare with was not available       All diagnostic, treatment, and disposition decisions were made by myself in conjunction with the Advanced Practice Provider. For all further details of the patient's emergency department visit, please see the Advanced Practice Provider's documentation.         Lani Sorensen, DO  10/25/22 [address] [place], DO  10/25/22 7848

## 2022-10-25 NOTE — ED PROVIDER NOTES
[address] ENCOUNTER        Pt Name: Han Villalba  MRN: [id number]  [place] 1980  Date of evaluation: 10/25/2022  Provider: FAVIOLA Kendall - ALCIRA  PCP: Chandra Corral MD  Note Started: 8:31 AM EDT       I have seen and evaluated this patient with my supervising physician Manish Zavala DO. Triage CHIEF COMPLAINT       Chief Complaint   Patient presents with    Fatigue     Weakness for past few days, unsteady on feet, blurred vision         HISTORY OF PRESENT ILLNESS   (Location/Symptom, Timing/Onset, Context/Setting, Quality, Duration, Modifying Factors, Severity)  Note limiting factors. Chief Complaint: Fatigue, weakness    Han Villalba is a 43 y.o. male who presents to the emergency department with fatigue and weakness that has been ongoing for the past 3 to 4 days. He is employed at the [employer]. He is here with his significant other. He has been feeling fatigue and weak with blurred vision for the past 2 to 3 days. Today he actually had a episode where he was so weak that he had to crawl on the floor. He denies any recent traumas. He has had episodes like this in the past but not to this extreme. He does have a history of anxiety and depression. He takes Klonopin for this and has taken several Klonopin today. Denies nausea, vomiting, diarrhea or fevers. He not had any headaches, body aches, sore throat. He has no cardiac history. He is a non-smoker. Denies drug or alcohol use. Nursing Notes were all reviewed and agreed with or any disagreements were addressed in the HPI. REVIEW OF SYSTEMS    (2-9 systems for level 4, 10 or more for level 5)     Review of Systems   Constitutional:  Positive for chills and fatigue. Negative for fever. HENT:  Negative for congestion and sore throat. Eyes:  Positive for visual disturbance.    Respiratory:  Negative for cough, chest tightness and shortness of breath. Cardiovascular:  Negative for chest pain and leg swelling. Gastrointestinal:  Negative for abdominal pain, blood in stool, constipation, diarrhea, nausea and vomiting. Genitourinary:  Negative for difficulty urinating. Musculoskeletal:  Negative for arthralgias and myalgias. Skin:  Negative for color change and rash. Neurological:  Positive for weakness. Negative for dizziness and headaches. Psychiatric/Behavioral:  Negative for confusion. PAST MEDICAL HISTORY     Past Medical History:   Diagnosis Date    Depression and anxiety 2008    Rosita    Medial epicondylitis of elbow 5/6/2015    Restless leg syndrome 2015    Snuff user 2014       SURGICAL HISTORY   History reviewed. No pertinent surgical history. CURRENTMEDICATIONS       Previous Medications    BUSPIRONE (BUSPAR) 10 MG TABLET    TAKE 1 TABLET BY MOUTH IN THE MORNING and 2 tablets at bedtime    CLONAZEPAM (KLONOPIN) 0.5 MG TABLET    Take 0.5 mg by mouth 2 times daily as needed. Fermín Patella ESCITALOPRAM (LEXAPRO) 10 MG TABLET    Take 10 mg by mouth daily    ESCITALOPRAM (LEXAPRO) 20 MG TABLET    TAKE 1 TABLET BY MOUTH EVERY DAY    HYDROXYZINE PAMOATE (VISTARIL) 25 MG CAPSULE    TAKE 1 CAPSULE BY MOUTH IN THE MORNING AND 2 CAPSULES AT BEDTIME AS NEEDED    MIRTAZAPINE (REMERON) 15 MG TABLET    TAKE 1 TABLET BY MOUTH AT BEDTIME       ALLERGIES     Patient has no known allergies. FAMILYHISTORY     History reviewed. No pertinent family history.      SOCIAL HISTORY       Social History     Socioeconomic History    Marital status:      Spouse name: Alina Desai    Number of children: None    Years of education: HS    Highest education level: None   Occupational History    Occupation:    Tobacco Use    Smoking status: Never    Smokeless tobacco: Current     Types: Snuff    Tobacco comments:     snuff tobacco   Substance and Sexual Activity    Alcohol use: No     Alcohol/week: 0.0 standard drinks    Drug use: No       SCREENINGS             PHYSICAL EXAM    (up to 7 for level 4, 8 or more for level 5)     ED Triage Vitals [10/25/22 0821]   BP Temp Temp Source Heart Rate Resp SpO2 Height Weight   (!) 153/97 97.9 °F (36.6 °C) Oral 84 16 97 % 6' 1\" (1.854 m) 230 lb (104.3 kg)       Physical Exam  Vitals and nursing note reviewed. Constitutional:       General: He is not in acute distress. Appearance: He is not ill-appearing or toxic-appearing. HENT:      Head: Normocephalic and atraumatic. Right Ear: External ear normal.      Left Ear: External ear normal.      Nose: Nose normal.      Mouth/Throat:      Mouth: Mucous membranes are moist.      Pharynx: No oropharyngeal exudate or posterior oropharyngeal erythema. Eyes:      General: Lids are normal. No scleral icterus. Right eye: No discharge. Left eye: No discharge. Extraocular Movements:      Right eye: No nystagmus. Left eye: No nystagmus. Conjunctiva/sclera:      Right eye: Right conjunctiva is not injected. No exudate or hemorrhage. Left eye: Left conjunctiva is not injected. No exudate or hemorrhage. Cardiovascular:      Rate and Rhythm: Normal rate and regular rhythm. Pulses: Normal pulses. Heart sounds: Normal heart sounds. Pulmonary:      Effort: Pulmonary effort is normal. No respiratory distress. Breath sounds: Normal breath sounds. Abdominal:      General: There is no distension. Palpations: Abdomen is soft. Tenderness: There is no abdominal tenderness. Musculoskeletal:         General: Normal range of motion. Cervical back: Normal range of motion and neck supple. No rigidity or tenderness. Skin:     General: Skin is warm and dry. Capillary Refill: Capillary refill takes less than 2 seconds. Neurological:      General: No focal deficit present. Mental Status: He is alert and oriented to person, place, and time.    Psychiatric:         Mood and Affect: Mood normal. Affect is flat. DIAGNOSTIC RESULTS   LABS:    Labs Reviewed   CBC WITH AUTO DIFFERENTIAL - Abnormal; Notable for the following components:       Result Value    Hematocrit 52.3 (*)     Segs Relative 75.3 (*)     Lymphocytes % 17.2 (*)     Monocytes % 6.1 (*)     Immature Neutrophil % 0.5 (*)     All other components within normal limits   COMPREHENSIVE METABOLIC PANEL W/ REFLEX TO MG FOR LOW K - Abnormal; Notable for the following components:    Glucose 134 (*)     All other components within normal limits   COVID-19, RAPID   RAPID INFLUENZA A/B ANTIGENS   TROPONIN   BRAIN NATRIURETIC PEPTIDE       When ordered, only abnormal lab results are displayed. All other labs were within normal range or not returned as of this dictation. EKG: When ordered, EKG's are interpreted by the Emergency Department Physician in the absence of a cardiologist.  Please see their note for interpretation of EKG. RADIOLOGY:   Non-plain film images such as CT, Ultrasound and MRI are read by the radiologist. Plain radiographic images are visualized andpreliminarily interpreted by the  ED Provider with the below findings:    Per Radiologist interpretation below, if available at the time of this note:    XR CHEST PORTABLE   Final Result   No acute abnormality. CT HEAD WO CONTRAST   Final Result   No acute intracranial abnormality. PROCEDURES   Unless otherwise noted below, none     Procedures    CRITICAL CARE TIME     NA    CONSULTS:  None      EMERGENCY DEPARTMENT COURSE and DIFFERENTIAL DIAGNOSIS/MDM:   Vitals:    Vitals:    10/25/22 0821   BP: (!) 153/97   Pulse: 84   Resp: 16   Temp: 97.9 °F (36.6 °C)   TempSrc: Oral   SpO2: 97%   Weight: 230 lb (104.3 kg)   Height: 6' 1\" (1.854 m)       Is this patient to be included in the SEP-1 Core Measure due to severe sepsis or septic shock?    No   Exclusion criteria - the patient is NOT to be included for SEP-1 Core Measure due to:  2+ SIRS criteria are not met     This is an alert and oriented x4, 43 y.o. yo male who presents emergency department with fatigue weakness. Patient has easy nonlabored respirations. Vitals notable for hypertension. Other vital signs within acceptable parameters. Patient is afebrile. He has a flat affect. Skin warm pink and dry. Lung sounds clear. Abdomen soft and nontender. He has no focal neurodeficits noted. Denies any pain assessment as noted above. EKG obtained showing A. fib. Review of patient's medical records do not indicate a past history of atrial fibrillation. Labs unremarkable. Patient does not have an elevated white count. Troponin within normal parameters. Patient is negative for influenza as well as COVID. CT of the head was obtained and showed no acute intercranial abnormality. BNP was added to the patient's work-up as he has enlarged heart. It is within acceptable parameters. Patient was given thefollowing medications:  Medications - No data to display    Patient was discharged in good condition  FINAL IMPRESSION      1. Other fatigue          DISPOSITION/PLAN   DISPOSITION Decision To Discharge 10/25/2022 01:10:20 PM      PATIENT REFERREDTO:  Quin Gallo MD  [facility] [address]  [phone]    Schedule an appointment as soon as possible for a visit in 1 day      [address] Emergency Department  [place] [id number] [phone]    If symptoms worsen    DISCHARGE MEDICATIONS:  New Prescriptions    No medications on file     DISCONTINUED MEDICATIONS:  Discontinued Medications    No medications on file     Comment: Please note this report has been produced using speech recognition software and may contain errors related to that system including errors in grammar, punctuation, and spelling, as well as words and phrases that may be inappropriate.  If there are any questions or concerns please feel free to contact the dictating provider for clarification.     FAVIOLA Davila CNP (electronically signed)      FAVIOLA Davila CNP  10/25/22 7342

## 2023-06-22 ENCOUNTER — HOSPITAL ENCOUNTER (OUTPATIENT)
Age: 43
Discharge: HOME | End: 2023-06-22
Payer: COMMERCIAL

## 2023-06-22 ENCOUNTER — HOSPITAL ENCOUNTER (OUTPATIENT)
Dept: GENERAL RADIOLOGY | Age: 43
Discharge: HOME | End: 2023-06-22
Payer: COMMERCIAL

## 2023-06-22 DIAGNOSIS — S96.811A RUPTURE OF PLANTARIS TENDON, RIGHT, INITIAL ENCOUNTER: ICD-10-CM

## 2023-06-22 PROCEDURE — 73630 X-RAY EXAM OF FOOT: CPT

## 2025-06-06 ENCOUNTER — APPOINTMENT (OUTPATIENT)
Dept: GENERAL RADIOLOGY | Age: 45
End: 2025-06-06
Payer: COMMERCIAL

## 2025-06-06 ENCOUNTER — HOSPITAL ENCOUNTER (EMERGENCY)
Age: 45
Discharge: HOME | End: 2025-06-06
Attending: STUDENT IN AN ORGANIZED HEALTH CARE EDUCATION/TRAINING PROGRAM
Payer: COMMERCIAL

## 2025-06-06 VITALS
TEMPERATURE: 99.1 F | HEIGHT: 73 IN | OXYGEN SATURATION: 97 % | WEIGHT: 230 LBS | HEART RATE: 75 BPM | DIASTOLIC BLOOD PRESSURE: 86 MMHG | SYSTOLIC BLOOD PRESSURE: 129 MMHG | RESPIRATION RATE: 16 BRPM | BODY MASS INDEX: 30.48 KG/M2

## 2025-06-06 DIAGNOSIS — M25.561 ACUTE PAIN OF RIGHT KNEE: Primary | ICD-10-CM

## 2025-06-06 PROCEDURE — 99283 EMERGENCY DEPT VISIT LOW MDM: CPT

## 2025-06-06 PROCEDURE — 73562 X-RAY EXAM OF KNEE 3: CPT

## 2025-06-06 ASSESSMENT — PAIN - FUNCTIONAL ASSESSMENT: PAIN_FUNCTIONAL_ASSESSMENT: 0-10

## 2025-06-06 ASSESSMENT — LIFESTYLE VARIABLES
HOW OFTEN DO YOU HAVE A DRINK CONTAINING ALCOHOL: NEVER
HOW MANY STANDARD DRINKS CONTAINING ALCOHOL DO YOU HAVE ON A TYPICAL DAY: PATIENT DOES NOT DRINK

## 2025-06-06 ASSESSMENT — PAIN SCALES - GENERAL: PAINLEVEL_OUTOF10: 7

## 2025-06-06 ASSESSMENT — PAIN DESCRIPTION - DESCRIPTORS: DESCRIPTORS: ACHING

## 2025-06-06 ASSESSMENT — PAIN DESCRIPTION - LOCATION: LOCATION: KNEE

## 2025-06-06 ASSESSMENT — PAIN DESCRIPTION - ORIENTATION: ORIENTATION: RIGHT

## 2025-06-06 NOTE — DISCHARGE INSTRUCTIONS
Your Xray did not show a fracture or dislocation  You can use acetaminophen as needed for pain  You can use ibuprofen as needed for pain  You can call and follow up with Orthopedics in the next 1-3 days regarding your symptoms  Call and follow-up with your family doctor in the next 1-3 days  Return to the ED if your symptoms worsen or you feel you need to be reevaluated    You can use the resources below to find a new family doctor if needed:                  Providers Welcoming New Patients:      [facility]    Austin Francois, APRN-CNP  [address] [place] [address]  ?? [phone]    Maggie Ross, APRN-CNP  [address] [place] [address]  ?? [phone]    Rj Patrick M.D.  [address] [place] [address]  ?? [phone]    Nadia Mcgraw, APRN-CNP  Laura Guerrero, APRN-CNP  [address], [address], [place] [address]  ?? [phone]    Felicita Rodríguez M.D.  Fitz Khoury M.D.   JEFF Morales, APRN-CNP  [address], [place] [address]  ?? [phone]    Jax Grullon M.D.  [address] [place] [address]  ?? [phone]    Romina Ocampo, APRN-CNP  [address], [address], [place] [address]  ?? [phone]        [facility]    Evie Jones M.D.  Melvi Piper NP  [address], [place] [address]  ?? [phone]    Ligia Brambila, APRN-CNP - Pediatrics only  [address], [place] [address]  ?? [phone]    Carter Nobles M.D.  Alber Burnett M.D.  [address] [facility], [address], [place] [address]  ?? [phone]    Giovanni Davenport, APRN-CNP  [address] [place] [address]  ?? [phone]      ?? Please note that new patient appointment wait times may vary among providers.      Please go to [employer] or call [phone] to find a new provider.  You can also use the QR code below:          ++++++++++++++++++++++++++++++++++++++++++++++++++++++++++++++++++++      For Acute Care Needs or

## 2025-06-06 NOTE — ED PROVIDER NOTES
Emergency Department Encounter        Pt Name: David Bang  MRN: [id number]  Birthdate 1980  Date of evaluation: 6/6/2025  ED Physician: Kanu Manning MD    CHIEF COMPLAINT     Triage Chief Complaint:   Knee Pain (Right knee pain after stepping up on a stair)      HISTORY OF PRESENT ILLNESS & REVIEW OF SYSTEMS     History obtained from the patient and staff.    David Bang is a 45 y.o. male who presents to the emergency department for evaluation of right knee pain.  Says that yesterday he was at work when he stepped off a step and landed on his right knee and felt some severe right sided medial knee pain.  Is able to ambulate although does have some pain specially with movement.  Denies any other injuries or concerns.  Denies any numbness weakness or tingling.        Patient denies any new Headache, Fever, Chills, Cough, Chest pain, Shortness of breath, Abdominal pain, Nausea, Vomiting, Diarrhea, Constipation, and Leg swelling.    The patient has no other acute complaints at this time.  Review of systems as above.          PAST MED/SURG/SOCIAL/FAM HISTORY & ALLERGY & MEDICATIONS     Past Medical History:   Diagnosis Date    Depression and anxiety 2008    [facility]    Medial epicondylitis of elbow 5/6/2015    Restless leg syndrome 2015    Snuff user 2014     Patient Active Problem List   Diagnosis Code    Social anxiety disorder F40.10    Snuff user Z72.0    Restless leg syndrome G25.81    Unilateral inguinal hernia without obstruction or gangrene K40.90    Chronic groin pain, right R10.31, G89.29     History reviewed. No pertinent family history.  No current facility-administered medications for this encounter.    Current Outpatient Medications:     busPIRone (BUSPAR) 10 MG tablet, TAKE 1 TABLET BY MOUTH IN THE MORNING and 2 tablets at bedtime, Disp: , Rfl:     hydrOXYzine pamoate (VISTARIL) 25 MG capsule, TAKE 1 CAPSULE BY MOUTH IN THE MORNING AND 2 CAPSULES AT BEDTIME AS NEEDED, Disp: , Rfl: